# Patient Record
Sex: FEMALE | Race: WHITE | Employment: OTHER | URBAN - METROPOLITAN AREA
[De-identification: names, ages, dates, MRNs, and addresses within clinical notes are randomized per-mention and may not be internally consistent; named-entity substitution may affect disease eponyms.]

---

## 2022-06-01 NOTE — PROGRESS NOTES
Patient ___ reached   __X___not reached-preop instructions left on voice osgp____[phone]_________      DATE__6/3/22______ TIME__1200______ARRIVAL__1030  FEC_______      Nothing to eat or drink after midnight the night before,except for what the prep instructions call for. If you do not have the instructions or do not understand them please contact your doctors office. Follow any instructions your doctors office has given you including what medications to take the AM of your procedure and which ones to hold. You may use your inhalers. If you take a long acting insulin the lynette prior please cut the dose in half and take no diabetic medications that AM.Follow specific doctors office instructions regarding blood thinners and if they want you to hold and for how long. If you are on a blood thinner and have no instructions please contact the office and ask. Dress comfortably,bring your insurance card,picture ID,and a complete list of medications, including supplements. You must have a responsible adult to stay with you during the procedure,drive you home and stay with you. [facility] phone number [phone] for any questions. OTHER INSTRUCTIONS(if applicable)_________________________________________________________        VISITOR POLICY(subject to change)    Current visitor policy is 2 visitors per patient. No children allowed. Mask are required. Visiting hours are 8a-8p. Overnight visitors will be at the discretion of the nurse.

## 2022-06-03 ENCOUNTER — HOSPITAL ENCOUNTER (OUTPATIENT)
Age: 82
Setting detail: OUTPATIENT SURGERY
Discharge: HOME | End: 2022-06-03
Attending: INTERNAL MEDICINE | Admitting: INTERNAL MEDICINE
Payer: MEDICARE

## 2022-06-03 ENCOUNTER — ANESTHESIA (OUTPATIENT)
Dept: ENDOSCOPY | Age: 82
End: 2022-06-03
Payer: MEDICARE

## 2022-06-03 ENCOUNTER — ANESTHESIA EVENT (OUTPATIENT)
Dept: ENDOSCOPY | Age: 82
End: 2022-06-03
Payer: MEDICARE

## 2022-06-03 VITALS
SYSTOLIC BLOOD PRESSURE: 141 MMHG | RESPIRATION RATE: 16 BRPM | HEART RATE: 71 BPM | TEMPERATURE: 98.8 F | OXYGEN SATURATION: 100 % | WEIGHT: 177 LBS | HEIGHT: 64 IN | DIASTOLIC BLOOD PRESSURE: 78 MMHG | BODY MASS INDEX: 30.22 KG/M2

## 2022-06-03 DIAGNOSIS — Z12.11 COLON CANCER SCREENING: ICD-10-CM

## 2022-06-03 DIAGNOSIS — Z86.010 HISTORY OF COLON POLYPS: ICD-10-CM

## 2022-06-03 PROCEDURE — 3609010600 HC COLONOSCOPY POLYPECTOMY SNARE/COLD BIOPSY: Performed by: INTERNAL MEDICINE

## 2022-06-03 PROCEDURE — 2580000003 HC RX 258: Performed by: ANESTHESIOLOGY

## 2022-06-03 PROCEDURE — 2500000003 HC RX 250 WO HCPCS: Performed by: NURSE ANESTHETIST, CERTIFIED REGISTERED

## 2022-06-03 PROCEDURE — 2580000003 HC RX 258: Performed by: NURSE ANESTHETIST, CERTIFIED REGISTERED

## 2022-06-03 PROCEDURE — 3700000001 HC ADD 15 MINUTES (ANESTHESIA): Performed by: INTERNAL MEDICINE

## 2022-06-03 PROCEDURE — 2709999900 HC NON-CHARGEABLE SUPPLY: Performed by: INTERNAL MEDICINE

## 2022-06-03 PROCEDURE — 7100000010 HC PHASE II RECOVERY - FIRST 15 MIN: Performed by: INTERNAL MEDICINE

## 2022-06-03 PROCEDURE — 88305 TISSUE EXAM BY PATHOLOGIST: CPT

## 2022-06-03 PROCEDURE — 7100000011 HC PHASE II RECOVERY - ADDTL 15 MIN: Performed by: INTERNAL MEDICINE

## 2022-06-03 PROCEDURE — 3700000000 HC ANESTHESIA ATTENDED CARE: Performed by: INTERNAL MEDICINE

## 2022-06-03 PROCEDURE — 6370000000 HC RX 637 (ALT 250 FOR IP): Performed by: INTERNAL MEDICINE

## 2022-06-03 PROCEDURE — 6360000002 HC RX W HCPCS: Performed by: NURSE ANESTHETIST, CERTIFIED REGISTERED

## 2022-06-03 RX ORDER — SODIUM CHLORIDE 9 MG/ML
INJECTION, SOLUTION INTRAVENOUS CONTINUOUS
Status: DISCONTINUED | OUTPATIENT
Start: 2022-06-03 | End: 2022-06-03 | Stop reason: HOSPADM

## 2022-06-03 RX ORDER — PROPOFOL 10 MG/ML
INJECTION, EMULSION INTRAVENOUS PRN
Status: DISCONTINUED | OUTPATIENT
Start: 2022-06-03 | End: 2022-06-03 | Stop reason: SDUPTHER

## 2022-06-03 RX ORDER — SODIUM CHLORIDE 9 MG/ML
INJECTION, SOLUTION INTRAVENOUS CONTINUOUS PRN
Status: DISCONTINUED | OUTPATIENT
Start: 2022-06-03 | End: 2022-06-03 | Stop reason: SDUPTHER

## 2022-06-03 RX ORDER — LIDOCAINE HYDROCHLORIDE 20 MG/ML
INJECTION, SOLUTION EPIDURAL; INFILTRATION; INTRACAUDAL; PERINEURAL PRN
Status: DISCONTINUED | OUTPATIENT
Start: 2022-06-03 | End: 2022-06-03 | Stop reason: SDUPTHER

## 2022-06-03 RX ADMIN — SODIUM CHLORIDE: 9 INJECTION, SOLUTION INTRAVENOUS at 11:13

## 2022-06-03 RX ADMIN — PROPOFOL 50 MG: 10 INJECTION, EMULSION INTRAVENOUS at 11:51

## 2022-06-03 RX ADMIN — PROPOFOL 50 MG: 10 INJECTION, EMULSION INTRAVENOUS at 11:45

## 2022-06-03 RX ADMIN — LIDOCAINE HYDROCHLORIDE 100 MG: 20 INJECTION, SOLUTION EPIDURAL; INFILTRATION; INTRACAUDAL; PERINEURAL at 11:36

## 2022-06-03 RX ADMIN — PROPOFOL 50 MG: 10 INJECTION, EMULSION INTRAVENOUS at 11:41

## 2022-06-03 RX ADMIN — SODIUM CHLORIDE: 9 INJECTION, SOLUTION INTRAVENOUS at 11:33

## 2022-06-03 RX ADMIN — PROPOFOL 50 MG: 10 INJECTION, EMULSION INTRAVENOUS at 11:56

## 2022-06-03 RX ADMIN — PROPOFOL 50 MG: 10 INJECTION, EMULSION INTRAVENOUS at 12:01

## 2022-06-03 RX ADMIN — PROPOFOL 50 MG: 10 INJECTION, EMULSION INTRAVENOUS at 11:36

## 2022-06-03 ASSESSMENT — ENCOUNTER SYMPTOMS: SHORTNESS OF BREATH: 0

## 2022-06-03 ASSESSMENT — PAIN - FUNCTIONAL ASSESSMENT: PAIN_FUNCTIONAL_ASSESSMENT: NONE - DENIES PAIN

## 2022-06-03 NOTE — BRIEF OP NOTE
Brief Postoperative Note - Full Note in Chart Review/Procedures tab       Patient: Zach Ventura  YOB: 1940  MRN: [id number]    Date of Procedure: 6/3/2022    Pre-Op Diagnosis: Colon cancer screening [Z12.11]  History of colon polyps [Z86.010]    Post-Op Diagnosis: Same       Procedure(s):  COLONOSCOPY POLYPECTOMY SNARE/COLD BIOPSY    Surgeon(s):  Renae Clark MD    Assistant:  * No surgical staff found *    Anesthesia: Monitor Anesthesia Care    Estimated Blood Loss (mL): Minimal    Complications: None    Specimens:   ID Type Source Tests Collected by Time Destination   A : Descending colon polyp Tissue Colon SURGICAL PATHOLOGY Renae Clark MD 6/3/2022 1202        Implants:  * No implants in log *      Drains: * No LDAs found *    Findings:  1) 4 mm descending colon polyp removed with cold snare. 2) Mild sigmoid diverticulosis    3) Normal terminal ileum    Rec:  High fiber diet  Continue present treatment. Start Benefiber, one tbs twice daily  Await pathology results.    F/U WITH ME AS NEEDED  Due to age, and multiple negative examinations, followup colonoscopy in five years should be considered optional  Followup with referring physician as previously instucted    Electronically signed by Renae Clark MD on 6/3/2022 at 12:08 PM

## 2022-06-03 NOTE — ANESTHESIA POSTPROCEDURE EVALUATION
Department of Anesthesiology  Postprocedure Note    Patient: Francine Owen  MRN: [id number]  YOB: 1940  Date of evaluation: 6/3/2022  Time:  12:14 PM     Procedure Summary     Date: 06/03/22 Room / Location: [address] [place] [address]    Anesthesia Start: 1134 Anesthesia Stop: 1214    Procedure: COLONOSCOPY POLYPECTOMY SNARE/COLD BIOPSY (N/A ) Diagnosis:       Colon cancer screening      History of colon polyps      (Colon cancer screening [Z12.11])      (History of colon polyps [Z86.010])    Surgeons: Mirian Jones MD Responsible Provider: Hannah Davies MD    Anesthesia Type: MAC ASA Status: 2          Anesthesia Type: No value filed. Gardenia Phase I: Gardenia Score: 10    Gardenia Phase II:      Last vitals: Reviewed and per EMR flowsheets. Anesthesia Post Evaluation    Patient location during evaluation: PACU  Level of consciousness: awake  Airway patency: patent  Complications: no  Cardiovascular status: hemodynamically stable  Respiratory status: acceptable  There was medical reason for not using a multimodal analgesia pain management approach.

## 2022-06-03 NOTE — ANESTHESIA PRE PROCEDURE
Department of Anesthesiology  Preprocedure Note       Name:  Petrona Kumar   Age:  [de-identified] y.o.  :  1940                                          MRN:  [id number]         Date:  6/3/2022      Surgeon: Jack Ortiz):  Fariha Flanagan MD    Procedure: Procedure(s):  COLONOSCOPY DIAGNOSTIC    Medications prior to admission:   Prior to Admission medications    Medication Sig Start Date End Date Taking? Authorizing Provider   BACLOFEN PO Take by mouth in the morning, at noon, and at bedtime   Yes Historical Provider, MD   METOPROLOL SUCCINATE PO Take by mouth daily   Yes Historical Provider, MD   carbidopa-levodopa (SINEMET)  MG per tablet Take 1 tablet by mouth 3-4 times daily    Historical Provider, MD   DULoxetine (CYMBALTA) 20 MG capsule Take 20 mg by mouth daily    Historical Provider, MD   omeprazole (PRILOSEC) 20 MG capsule Take 40 mg by mouth daily   Patient not taking: Reported on 6/3/2022    Historical Provider, MD   celecoxib (CELEBREX) 100 MG capsule Take 200 mg by mouth 2 times daily    Historical Provider, MD   melatonin 3 MG TABS tablet Take 3 mg by mouth nightly    Historical Provider, MD   pramipexole (MIRAPEX) 0.25 MG tablet Take 0.25 mg by mouth nightly    Historical Provider, MD       Current medications:    Current Facility-Administered Medications   Medication Dose Route Frequency Provider Last Rate Last Admin    0.9 % sodium chloride infusion   IntraVENous Continuous Kwame Diggs MD           Allergies:     Allergies   Allergen Reactions    Trileptal [Oxcarbazepine]     Yellow Dyes (Non-Tartrazine) Swelling    Minocycline Rash       Problem List:    Patient Active Problem List   Diagnosis Code    Chest pain R07.9    Parkinsonism ([facility] [address] G20       Past Medical History:        Diagnosis Date    Parkinson's disease ([facility] [address]     Spinal stenosis        Past Surgical History:        Procedure Laterality Date    APPENDECTOMY      BACK SURGERY  2014    decompression surgery     SECTION      CHOLECYSTECTOMY         Social History:    Social History     Tobacco Use    Smoking status: Never Smoker    Smokeless tobacco: Not on file   Substance Use Topics    Alcohol use: Yes     Alcohol/week: 2.0 standard drinks     Types: 2 Glasses of wine per week                                Counseling given: Not Answered      Vital Signs (Current):   Vitals:    22 1052   BP: (!) 158/72   Pulse: 76   Resp: 16   Temp: (!) 96.7 °F (35.9 °C)   TempSrc: Temporal   SpO2: 99%   Weight: 177 lb (80.3 kg)   Height: 5' 4\" (1.626 m)                                              BP Readings from Last 3 Encounters:   22 (!) 158/72   10/11/16 122/69   16 120/68       NPO Status:                                                                                 BMI:   Wt Readings from Last 3 Encounters:   22 177 lb (80.3 kg)   10/11/16 166 lb 6.4 oz (75.5 kg)   16 166 lb 12.8 oz (75.7 kg)     Body mass index is 30.38 kg/m². CBC:   Lab Results   Component Value Date    WBC 12.0 2016    RBC 3.94 2016    HGB 12.2 2016    HCT 36.1 2016    MCV 91.7 2016    RDW 14.0 2016     2016       CMP:   Lab Results   Component Value Date     2016    K 4.4 2016     2016    CO2 25 2016    BUN 14 2016    CREATININE 0.7 2016    GFRAA >60 2016    GFRAA >60 2010    AGRATIO 1.0 2016    LABGLOM >60 2016    GLUCOSE 84 2016    PROT 6.9 2016    PROT 7.0 2010    CALCIUM 8.7 2016    BILITOT 0.5 2016    ALKPHOS 73 2016    AST 23 2016    ALT <5 2016       POC Tests: No results for input(s): POCGLU, POCNA, POCK, POCCL, POCBUN, POCHEMO, POCHCT in the last 72 hours.     Coags:   Lab Results   Component Value Date    PROTIME 12.2 2016    INR 1.07 2016    APTT 36.0 2016       HCG (If Applicable): No results found for: PREGTESTUR, PREGSERUM, HCG, HCGQUANT     ABGs: No results found for: PHART, PO2ART, JWD6AMH, HAU0YUW, BEART, O4PIAHOX     Type & Screen (If Applicable):  No results found for: LABABO, LABRH    Drug/Infectious Status (If Applicable):  No results found for: HIV, HEPCAB    COVID-19 Screening (If Applicable): No results found for: COVID19        Anesthesia Evaluation  Patient summary reviewed and Nursing notes reviewed no history of anesthetic complications:   Airway: Mallampati: I  TM distance: >3 FB   Neck ROM: full  Mouth opening: > = 3 FB   Dental: normal exam         Pulmonary:       (-) asthma and shortness of breath                           Cardiovascular:        (-) hypertension and  angina                Neuro/Psych:   (+) neuromuscular disease (states dyskinesia rather than parkinson's):,    (-) CVA           GI/Hepatic/Renal:        (-) GERD and liver disease       Endo/Other:        (-) diabetes mellitus, hypothyroidism               Abdominal:             Vascular:     - PVD. Other Findings:           Anesthesia Plan      MAC     ASA 2       Induction: intravenous. Anesthetic plan and risks discussed with patient. Plan discussed with CRNA.                     Isabel Carrasco MD   6/3/2022

## 2022-06-03 NOTE — H&P
Gastroenterology Note             Pre-operative History and Physical    Patient: Kb Harris  : 1940  CSN:     History Obtained From:  patient and/or guardian. HISTORY OF PRESENT ILLNESS:    The patient is a [de-identified] y.o. female with history of colon polyps and decreased caliber stools here for colonoscopy. Past Medical History:    Past Medical History:   Diagnosis Date    Parkinson's disease ([address]     Spinal stenosis      Past Surgical History:    Past Surgical History:   Procedure Laterality Date    APPENDECTOMY      BACK SURGERY  2014    decompression surgery     SECTION      CHOLECYSTECTOMY       Medications Prior to Admission:   No current facility-administered medications on file prior to encounter. Current Outpatient Medications on File Prior to Encounter   Medication Sig Dispense Refill    carbidopa-levodopa (SINEMET)  MG per tablet Take 1 tablet by mouth 3-4 times daily      DULoxetine (CYMBALTA) 20 MG capsule Take 20 mg by mouth daily      omeprazole (PRILOSEC) 20 MG capsule Take 40 mg by mouth daily       celecoxib (CELEBREX) 100 MG capsule Take 200 mg by mouth 2 times daily      melatonin 3 MG TABS tablet Take 3 mg by mouth nightly      pramipexole (MIRAPEX) 0.25 MG tablet Take 0.25 mg by mouth nightly          Allergies:  Trileptal [oxcarbazepine], Yellow dyes (non-tartrazine), and Minocycline      Social History:   Social History     Tobacco Use    Smoking status: Never Smoker    Smokeless tobacco: Not on file   Substance Use Topics    Alcohol use: Yes     Alcohol/week: 2.0 standard drinks     Types: 2 Glasses of wine per week     Family History:   No family history on file. PHYSICAL EXAM:      There were no vitals taken for this visit.  I        Heart:   RRR, normal s1s2    Lungs:  CTA bilat,  Normal effort    Abdomen:   NT, ND      ASA Grade:  ASA 2 - Patient with mild systemic disease with no functional limitations    Mallampati Class:  Class I: Soft palate, uvula, fauces, pillars visible  ____X_____  Class II: Soft palate, uvula, fauces visible  __________   Class III: Soft palate, base of uvula visible  __________  Class IV: Hard palate only visible   __________        ASSESSMENT AND PLAN:    1. Patient is a [de-identified] y.o. female here for colonoscopy with MAC.   2.  Procedure options, risks and benefits reviewed with patient. Patient expresses understanding.      Curtis Guzman MD  [facility]  6/3/2022

## 2023-03-31 ENCOUNTER — APPOINTMENT (OUTPATIENT)
Dept: GENERAL RADIOLOGY | Age: 83
End: 2023-03-31
Payer: MEDICARE

## 2023-03-31 ENCOUNTER — APPOINTMENT (OUTPATIENT)
Dept: CT IMAGING | Age: 83
End: 2023-03-31
Payer: MEDICARE

## 2023-03-31 ENCOUNTER — HOSPITAL ENCOUNTER (OUTPATIENT)
Age: 83
Setting detail: OBSERVATION
Discharge: HOME | End: 2023-04-03
Attending: STUDENT IN AN ORGANIZED HEALTH CARE EDUCATION/TRAINING PROGRAM | Admitting: INTERNAL MEDICINE
Payer: MEDICARE

## 2023-03-31 DIAGNOSIS — Z71.89 GOALS OF CARE, COUNSELING/DISCUSSION: ICD-10-CM

## 2023-03-31 DIAGNOSIS — E16.2 HYPOGLYCEMIA: ICD-10-CM

## 2023-03-31 DIAGNOSIS — R55 SYNCOPE AND COLLAPSE: Primary | ICD-10-CM

## 2023-03-31 DIAGNOSIS — S82.53XA DISPLACED FRACTURE OF MEDIAL MALLEOLUS OF UNSPECIFIED TIBIA, INITIAL ENCOUNTER FOR CLOSED FRACTURE: ICD-10-CM

## 2023-03-31 DIAGNOSIS — Z78.9 PATIENT IS FULL CODE: ICD-10-CM

## 2023-03-31 DIAGNOSIS — E86.0 DEHYDRATION: ICD-10-CM

## 2023-03-31 PROBLEM — S82.51XA DISPLACED FRACTURE OF MEDIAL MALLEOLUS OF RIGHT TIBIA, INITIAL ENCOUNTER FOR CLOSED FRACTURE: Status: ACTIVE | Noted: 2023-03-31

## 2023-03-31 LAB
ALBUMIN SERPL-MCNC: 3.9 G/DL (ref 3.4–5)
ALBUMIN/GLOB SERPL: 1.6 {RATIO} (ref 1.1–2.2)
ALP SERPL-CCNC: 66 U/L (ref 40–129)
ALT SERPL-CCNC: 13 U/L (ref 10–40)
ANION GAP SERPL CALCULATED.3IONS-SCNC: 6 MMOL/L (ref 3–16)
AST SERPL-CCNC: 16 U/L (ref 15–37)
BASOPHILS # BLD: 0.1 K/UL (ref 0–0.2)
BASOPHILS NFR BLD: 1 %
BILIRUB SERPL-MCNC: 0.5 MG/DL (ref 0–1)
BUN SERPL-MCNC: 22 MG/DL (ref 7–20)
CALCIUM SERPL-MCNC: 9.1 MG/DL (ref 8.3–10.6)
CHLORIDE SERPL-SCNC: 104 MMOL/L (ref 99–110)
CO2 SERPL-SCNC: 29 MMOL/L (ref 21–32)
CREAT SERPL-MCNC: 0.9 MG/DL (ref 0.6–1.2)
DEPRECATED RDW RBC AUTO: 14.8 % (ref 12.4–15.4)
EKG ATRIAL RATE: 58 BPM
EKG DIAGNOSIS: NORMAL
EKG P AXIS: 67 DEGREES
EKG P-R INTERVAL: 178 MS
EKG Q-T INTERVAL: 390 MS
EKG QRS DURATION: 76 MS
EKG QTC CALCULATION (BAZETT): 382 MS
EKG R AXIS: 52 DEGREES
EKG T AXIS: 66 DEGREES
EKG VENTRICULAR RATE: 58 BPM
EOSINOPHIL # BLD: 0.2 K/UL (ref 0–0.6)
EOSINOPHIL NFR BLD: 2.6 %
GFR SERPLBLD CREATININE-BSD FMLA CKD-EPI: >60 ML/MIN/{1.73_M2}
GLUCOSE BLD-MCNC: 128 MG/DL (ref 70–99)
GLUCOSE BLD-MCNC: 57 MG/DL (ref 70–99)
GLUCOSE SERPL-MCNC: 103 MG/DL (ref 70–99)
HCT VFR BLD AUTO: 40 % (ref 36–48)
HGB BLD-MCNC: 13.3 G/DL (ref 12–16)
LYMPHOCYTES # BLD: 2 K/UL (ref 1–5.1)
LYMPHOCYTES NFR BLD: 29.9 %
MCH RBC QN AUTO: 31.1 PG (ref 26–34)
MCHC RBC AUTO-ENTMCNC: 33.3 G/DL (ref 31–36)
MCV RBC AUTO: 93.2 FL (ref 80–100)
MONOCYTES # BLD: 0.5 K/UL (ref 0–1.3)
MONOCYTES NFR BLD: 7.9 %
NEUTROPHILS # BLD: 3.8 K/UL (ref 1.7–7.7)
NEUTROPHILS NFR BLD: 58.6 %
PERFORMED ON: ABNORMAL
PERFORMED ON: ABNORMAL
PLATELET # BLD AUTO: 195 K/UL (ref 135–450)
PMV BLD AUTO: 7.4 FL (ref 5–10.5)
POTASSIUM SERPL-SCNC: 4.5 MMOL/L (ref 3.5–5.1)
PROT SERPL-MCNC: 6.3 G/DL (ref 6.4–8.2)
RBC # BLD AUTO: 4.29 M/UL (ref 4–5.2)
SODIUM SERPL-SCNC: 139 MMOL/L (ref 136–145)
TROPONIN T SERPL-MCNC: <0.01 NG/ML
WBC # BLD AUTO: 6.5 K/UL (ref 4–11)

## 2023-03-31 PROCEDURE — 96374 THER/PROPH/DIAG INJ IV PUSH: CPT

## 2023-03-31 PROCEDURE — 6370000000 HC RX 637 (ALT 250 FOR IP): Performed by: NURSE PRACTITIONER

## 2023-03-31 PROCEDURE — 93005 ELECTROCARDIOGRAM TRACING: CPT | Performed by: STUDENT IN AN ORGANIZED HEALTH CARE EDUCATION/TRAINING PROGRAM

## 2023-03-31 PROCEDURE — 73130 X-RAY EXAM OF HAND: CPT

## 2023-03-31 PROCEDURE — 93010 ELECTROCARDIOGRAM REPORT: CPT | Performed by: INTERNAL MEDICINE

## 2023-03-31 PROCEDURE — 96361 HYDRATE IV INFUSION ADD-ON: CPT

## 2023-03-31 PROCEDURE — 6360000002 HC RX W HCPCS

## 2023-03-31 PROCEDURE — 2580000003 HC RX 258: Performed by: NURSE PRACTITIONER

## 2023-03-31 PROCEDURE — 70450 CT HEAD/BRAIN W/O DYE: CPT

## 2023-03-31 PROCEDURE — 6360000002 HC RX W HCPCS: Performed by: INTERNAL MEDICINE

## 2023-03-31 PROCEDURE — 96376 TX/PRO/DX INJ SAME DRUG ADON: CPT

## 2023-03-31 PROCEDURE — 73502 X-RAY EXAM HIP UNI 2-3 VIEWS: CPT

## 2023-03-31 PROCEDURE — 6360000002 HC RX W HCPCS: Performed by: NURSE PRACTITIONER

## 2023-03-31 PROCEDURE — 72125 CT NECK SPINE W/O DYE: CPT

## 2023-03-31 PROCEDURE — 84484 ASSAY OF TROPONIN QUANT: CPT

## 2023-03-31 PROCEDURE — G0378 HOSPITAL OBSERVATION PER HR: HCPCS

## 2023-03-31 PROCEDURE — 2580000003 HC RX 258

## 2023-03-31 PROCEDURE — 99285 EMERGENCY DEPT VISIT HI MDM: CPT

## 2023-03-31 PROCEDURE — 6370000000 HC RX 637 (ALT 250 FOR IP): Performed by: INTERNAL MEDICINE

## 2023-03-31 PROCEDURE — 71045 X-RAY EXAM CHEST 1 VIEW: CPT

## 2023-03-31 PROCEDURE — 1200000000 HC SEMI PRIVATE

## 2023-03-31 PROCEDURE — 73110 X-RAY EXAM OF WRIST: CPT

## 2023-03-31 PROCEDURE — 80053 COMPREHEN METABOLIC PANEL: CPT

## 2023-03-31 PROCEDURE — 73610 X-RAY EXAM OF ANKLE: CPT

## 2023-03-31 PROCEDURE — 2500000003 HC RX 250 WO HCPCS: Performed by: NURSE PRACTITIONER

## 2023-03-31 PROCEDURE — 85025 COMPLETE CBC W/AUTO DIFF WBC: CPT

## 2023-03-31 PROCEDURE — 96375 TX/PRO/DX INJ NEW DRUG ADDON: CPT

## 2023-03-31 PROCEDURE — 6370000000 HC RX 637 (ALT 250 FOR IP)

## 2023-03-31 RX ORDER — LANOLIN ALCOHOL/MO/W.PET/CERES
3 CREAM (GRAM) TOPICAL NIGHTLY
Status: DISCONTINUED | OUTPATIENT
Start: 2023-03-31 | End: 2023-04-03 | Stop reason: HOSPADM

## 2023-03-31 RX ORDER — DIPHENHYDRAMINE HCL 25 MG
25 TABLET ORAL NIGHTLY PRN
Status: DISCONTINUED | OUTPATIENT
Start: 2023-03-31 | End: 2023-04-03 | Stop reason: HOSPADM

## 2023-03-31 RX ORDER — 0.9 % SODIUM CHLORIDE 0.9 %
1000 INTRAVENOUS SOLUTION INTRAVENOUS ONCE
Status: COMPLETED | OUTPATIENT
Start: 2023-03-31 | End: 2023-03-31

## 2023-03-31 RX ORDER — LANOLIN ALCOHOL/MO/W.PET/CERES
3 CREAM (GRAM) TOPICAL NIGHTLY
Status: CANCELLED | OUTPATIENT
Start: 2023-03-31

## 2023-03-31 RX ORDER — FENTANYL CITRATE 50 UG/ML
50 INJECTION, SOLUTION INTRAMUSCULAR; INTRAVENOUS ONCE
Status: DISCONTINUED | OUTPATIENT
Start: 2023-03-31 | End: 2023-03-31

## 2023-03-31 RX ORDER — ENOXAPARIN SODIUM 100 MG/ML
40 INJECTION SUBCUTANEOUS DAILY
Status: CANCELLED | OUTPATIENT
Start: 2023-03-31

## 2023-03-31 RX ORDER — POLYETHYLENE GLYCOL 3350 17 G/17G
17 POWDER, FOR SOLUTION ORAL DAILY PRN
Status: CANCELLED | OUTPATIENT
Start: 2023-03-31

## 2023-03-31 RX ORDER — SODIUM CHLORIDE 0.9 % (FLUSH) 0.9 %
5-40 SYRINGE (ML) INJECTION EVERY 12 HOURS SCHEDULED
Status: CANCELLED | OUTPATIENT
Start: 2023-03-31

## 2023-03-31 RX ORDER — ACETAMINOPHEN 325 MG/1
650 TABLET ORAL EVERY 6 HOURS PRN
Status: CANCELLED | OUTPATIENT
Start: 2023-03-31

## 2023-03-31 RX ORDER — POLYETHYLENE GLYCOL 3350 17 G/17G
17 POWDER, FOR SOLUTION ORAL DAILY PRN
Status: DISCONTINUED | OUTPATIENT
Start: 2023-03-31 | End: 2023-04-02

## 2023-03-31 RX ORDER — MORPHINE SULFATE 2 MG/ML
2 INJECTION, SOLUTION INTRAMUSCULAR; INTRAVENOUS
Status: DISCONTINUED | OUTPATIENT
Start: 2023-03-31 | End: 2023-04-01

## 2023-03-31 RX ORDER — PRAMIPEXOLE DIHYDROCHLORIDE 0.25 MG/1
0.25 TABLET ORAL NIGHTLY
Status: DISCONTINUED | OUTPATIENT
Start: 2023-03-31 | End: 2023-04-03 | Stop reason: HOSPADM

## 2023-03-31 RX ORDER — SODIUM CHLORIDE 0.9 % (FLUSH) 0.9 %
5-40 SYRINGE (ML) INJECTION EVERY 12 HOURS SCHEDULED
Status: DISCONTINUED | OUTPATIENT
Start: 2023-03-31 | End: 2023-04-03 | Stop reason: HOSPADM

## 2023-03-31 RX ORDER — FENTANYL CITRATE 50 UG/ML
50 INJECTION, SOLUTION INTRAMUSCULAR; INTRAVENOUS ONCE
Status: COMPLETED | OUTPATIENT
Start: 2023-03-31 | End: 2023-03-31

## 2023-03-31 RX ORDER — OXYCODONE AND ACETAMINOPHEN 10; 325 MG/1; MG/1
1 TABLET ORAL EVERY 4 HOURS PRN
Status: DISCONTINUED | OUTPATIENT
Start: 2023-03-31 | End: 2023-04-03 | Stop reason: HOSPADM

## 2023-03-31 RX ORDER — ACETAMINOPHEN 325 MG/1
650 TABLET ORAL EVERY 6 HOURS PRN
Status: DISCONTINUED | OUTPATIENT
Start: 2023-03-31 | End: 2023-04-03 | Stop reason: HOSPADM

## 2023-03-31 RX ORDER — ONDANSETRON 4 MG/1
4 TABLET, ORALLY DISINTEGRATING ORAL EVERY 8 HOURS PRN
Status: CANCELLED | OUTPATIENT
Start: 2023-03-31

## 2023-03-31 RX ORDER — ONDANSETRON 2 MG/ML
4 INJECTION INTRAMUSCULAR; INTRAVENOUS EVERY 6 HOURS PRN
Status: CANCELLED | OUTPATIENT
Start: 2023-03-31

## 2023-03-31 RX ORDER — MORPHINE SULFATE 4 MG/ML
4 INJECTION, SOLUTION INTRAMUSCULAR; INTRAVENOUS
Status: DISCONTINUED | OUTPATIENT
Start: 2023-03-31 | End: 2023-04-01

## 2023-03-31 RX ORDER — SODIUM CHLORIDE 9 MG/ML
INJECTION, SOLUTION INTRAVENOUS PRN
Status: DISCONTINUED | OUTPATIENT
Start: 2023-03-31 | End: 2023-04-03 | Stop reason: HOSPADM

## 2023-03-31 RX ORDER — SODIUM CHLORIDE 9 MG/ML
INJECTION, SOLUTION INTRAVENOUS PRN
Status: CANCELLED | OUTPATIENT
Start: 2023-03-31

## 2023-03-31 RX ORDER — ONDANSETRON 2 MG/ML
4 INJECTION INTRAMUSCULAR; INTRAVENOUS EVERY 6 HOURS PRN
Status: DISCONTINUED | OUTPATIENT
Start: 2023-03-31 | End: 2023-04-03 | Stop reason: HOSPADM

## 2023-03-31 RX ORDER — SODIUM CHLORIDE 0.9 % (FLUSH) 0.9 %
5-40 SYRINGE (ML) INJECTION PRN
Status: DISCONTINUED | OUTPATIENT
Start: 2023-03-31 | End: 2023-04-03 | Stop reason: HOSPADM

## 2023-03-31 RX ORDER — DIPHENHYDRAMINE HCL 25 MG
25 TABLET ORAL NIGHTLY PRN
COMMUNITY

## 2023-03-31 RX ORDER — DEXTROSE MONOHYDRATE 25 G/50ML
25 INJECTION, SOLUTION INTRAVENOUS ONCE
Status: COMPLETED | OUTPATIENT
Start: 2023-03-31 | End: 2023-03-31

## 2023-03-31 RX ORDER — DIPHENHYDRAMINE HCL 25 MG
25 TABLET ORAL NIGHTLY PRN
Status: DISCONTINUED | OUTPATIENT
Start: 2023-04-01 | End: 2023-03-31

## 2023-03-31 RX ORDER — SODIUM CHLORIDE 9 MG/ML
INJECTION, SOLUTION INTRAVENOUS CONTINUOUS
Status: DISCONTINUED | OUTPATIENT
Start: 2023-03-31 | End: 2023-04-01

## 2023-03-31 RX ORDER — ACETAMINOPHEN 650 MG/1
650 SUPPOSITORY RECTAL EVERY 6 HOURS PRN
Status: DISCONTINUED | OUTPATIENT
Start: 2023-03-31 | End: 2023-04-03 | Stop reason: HOSPADM

## 2023-03-31 RX ORDER — ACETAMINOPHEN 650 MG/1
650 SUPPOSITORY RECTAL EVERY 6 HOURS PRN
Status: CANCELLED | OUTPATIENT
Start: 2023-03-31

## 2023-03-31 RX ORDER — SODIUM CHLORIDE 0.9 % (FLUSH) 0.9 %
5-40 SYRINGE (ML) INJECTION PRN
Status: CANCELLED | OUTPATIENT
Start: 2023-03-31

## 2023-03-31 RX ORDER — ONDANSETRON 4 MG/1
4 TABLET, ORALLY DISINTEGRATING ORAL EVERY 8 HOURS PRN
Status: DISCONTINUED | OUTPATIENT
Start: 2023-03-31 | End: 2023-04-03 | Stop reason: HOSPADM

## 2023-03-31 RX ORDER — ENOXAPARIN SODIUM 100 MG/ML
40 INJECTION SUBCUTANEOUS DAILY
Status: DISCONTINUED | OUTPATIENT
Start: 2023-04-01 | End: 2023-04-03 | Stop reason: HOSPADM

## 2023-03-31 RX ADMIN — MORPHINE SULFATE 2 MG: 2 INJECTION, SOLUTION INTRAMUSCULAR; INTRAVENOUS at 21:24

## 2023-03-31 RX ADMIN — DIPHENHYDRAMINE HYDROCHLORIDE 25 MG: 25 TABLET ORAL at 23:54

## 2023-03-31 RX ADMIN — SODIUM CHLORIDE: 9 INJECTION, SOLUTION INTRAVENOUS at 23:26

## 2023-03-31 RX ADMIN — DEXTROSE MONOHYDRATE 25 G: 25 INJECTION, SOLUTION INTRAVENOUS at 10:08

## 2023-03-31 RX ADMIN — FENTANYL CITRATE 50 MCG: 50 INJECTION, SOLUTION INTRAMUSCULAR; INTRAVENOUS at 16:34

## 2023-03-31 RX ADMIN — OXYCODONE AND ACETAMINOPHEN 1 TABLET: 325; 10 TABLET ORAL at 20:11

## 2023-03-31 RX ADMIN — MORPHINE SULFATE 4 MG: 4 INJECTION, SOLUTION INTRAMUSCULAR; INTRAVENOUS at 15:20

## 2023-03-31 RX ADMIN — Medication 10 ML: at 21:42

## 2023-03-31 RX ADMIN — DULOXETINE HYDROCHLORIDE 90 MG: 60 CAPSULE, DELAYED RELEASE ORAL at 23:25

## 2023-03-31 RX ADMIN — Medication 3 MG: at 21:35

## 2023-03-31 RX ADMIN — FENTANYL CITRATE 50 MCG: 50 INJECTION, SOLUTION INTRAMUSCULAR; INTRAVENOUS at 11:03

## 2023-03-31 RX ADMIN — SODIUM CHLORIDE 1000 ML: 9 INJECTION, SOLUTION INTRAVENOUS at 10:08

## 2023-03-31 RX ADMIN — CARBIDOPA AND LEVODOPA 1 TABLET: 10; 100 TABLET ORAL at 21:35

## 2023-03-31 ASSESSMENT — PAIN DESCRIPTION - LOCATION
LOCATION: ANKLE

## 2023-03-31 ASSESSMENT — PAIN DESCRIPTION - ORIENTATION
ORIENTATION: RIGHT

## 2023-03-31 ASSESSMENT — PAIN DESCRIPTION - DESCRIPTORS
DESCRIPTORS: ACHING
DESCRIPTORS: ACHING

## 2023-03-31 ASSESSMENT — PAIN SCALES - GENERAL
PAINLEVEL_OUTOF10: 6
PAINLEVEL_OUTOF10: 9
PAINLEVEL_OUTOF10: 7
PAINLEVEL_OUTOF10: 2
PAINLEVEL_OUTOF10: 4
PAINLEVEL_OUTOF10: 6
PAINLEVEL_OUTOF10: 7
PAINLEVEL_OUTOF10: 7

## 2023-03-31 ASSESSMENT — PAIN - FUNCTIONAL ASSESSMENT: PAIN_FUNCTIONAL_ASSESSMENT: NONE - DENIES PAIN

## 2023-03-31 NOTE — H&P
sinus bradycardia, low suspicion for cardiac cause of syncope  - Will start IVF @ 75mL/hour given concurrent soft BP  - Hold home metoprolol succinate 25mg in setting of soft BP  - Orthostatic vitals to be collected    Hypoglycemia, POA  - BG 57 on admission, received D50 in ED  - Continue IVF @ 75 mL/hr overnight  - Encourage adequate po intake  - Continue BG checks PRN    Parkinson's Disease  - Continue home carbidopa-levodopa TID  - PT/OT to evaluate    Restless leg syndrome  - Continue home pramipexole 0.25mg bedtime  - Continue pain management to avoid pain as an exacerbation of RLS    Hx of SVT  - Holding home metoprolol succinate 25mg in setting of soft BP and sinus bradycardia on admission, will revaluate as needed    Depression  - Continue home duloxetine 90mg    DVT Prophylaxis: Lovenox  Diet: ADULT DIET; Regular  Code Status: Prior    PT/OT Eval Status: ordered    Dispo - pending pain control, ortho eval       Quinton Fernando MD    Thank you Su King MD for the opportunity to be involved in this patient's care. If you have any questions or concerns please feel free to contact me at [phone]. Addendum to Resident H& P note:  Pt seen,examined and evaluated with resident and discussed regarding POC . I have reviewed the current history, physical findings, labs and assessment and plan , edited the note where appropriate and agree with note as documented by resident DO ( Candi Dial)    82 yo CF p/w fall and syncope . She had Right ankle fracture and Right ankle pain . Ortho consulted and had splint in ED . Ortho recommended conservative Mx . Will admit and get PT/OT with WBAT on Right Leg . The patient and / or the family were informed of the results of tests, a time was given to answer questions, a plan was proposed and they agreed with plan. Medical reconciliation performed.      Uri Rojas MD  Hospitalist Physician

## 2023-03-31 NOTE — ED NOTES
Hospitalist paged -patient reporting morphine \"does nothing\" for pain.        Aleks Yanes RN  03/31/23 0579

## 2023-03-31 NOTE — CARE COORDINATION
Attempted to see patient. Patient crying in pain and unable to interview. MD and RN updated.   Electronically signed by YARI Santana on 3/31/2023 at 3:46 PM

## 2023-03-31 NOTE — ED PROVIDER NOTES
The Ekg interpreted by me shows  Sinus bradycardia with a rate of 58  Axis is normal  QTc is normal  Intervals and Durations are unremarkable. ST Segments: Nonspecific ST changes    I did not see or evaluate this patient. This is ekg interpretation only.          Mookie Carlisle MD  03/31/23 3704
Mild osteoarthritis at the 1st   carpometacarpal joint. RIGHT HAND:      No acute abnormality of the right hand. 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 PELVIS/RIGHT HIP:      No acute radiographic abnormality of the right hip or osseous pelvis. Mild   symmetric bilateral hip and SI joint osteoarthritis. RIGHT ANKLE:      Acute mildly displaced transverse fracture of the medial malleolus, with   overlying soft tissue swelling. XR CHEST PORTABLE   Final Result   CHEST:      Mild bibasilar atelectasis, without additional acute intrathoracic   abnormality. RIGHT WRIST:      No acute abnormality of the right wrist.  Mild osteoarthritis at the 1st   carpometacarpal joint. RIGHT HAND:      No acute abnormality of the right hand. 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 PELVIS/RIGHT HIP:      No acute radiographic abnormality of the right hip or osseous pelvis. Mild   symmetric bilateral hip and SI joint osteoarthritis. RIGHT ANKLE:      Acute mildly displaced transverse fracture of the medial malleolus, with   overlying soft tissue swelling. XR ANKLE RIGHT (MIN 3 VIEWS)   Final Result   CHEST:      Mild bibasilar atelectasis, without additional acute intrathoracic   abnormality. RIGHT WRIST:      No acute abnormality of the right wrist.  Mild osteoarthritis at the 1st   carpometacarpal joint. RIGHT HAND:      No acute abnormality of the right hand. 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 PELVIS/RIGHT HIP:      No acute radiographic abnormality of the right hip or osseous pelvis. Mild   symmetric bilateral hip and SI joint osteoarthritis. RIGHT ANKLE:      Acute mildly displaced transverse fracture of the medial malleolus, with   overlying soft tissue swelling. CT HEAD WO CONTRAST   Final Result   Atrophy and mild small vessel ischemic disease.          CT CERVICAL SPINE WO CONTRAST   Final Result   No acute osseous

## 2023-03-31 NOTE — ED NOTES
Total fentanyl wasted 50mcg in pyxis and another 50mcg total 100mcg.       Juliana Ramírez RN  03/31/23 8138

## 2023-04-01 LAB
ANION GAP SERPL CALCULATED.3IONS-SCNC: 6 MMOL/L (ref 3–16)
BUN SERPL-MCNC: 18 MG/DL (ref 7–20)
CALCIUM SERPL-MCNC: 8.9 MG/DL (ref 8.3–10.6)
CHLORIDE SERPL-SCNC: 107 MMOL/L (ref 99–110)
CO2 SERPL-SCNC: 28 MMOL/L (ref 21–32)
CREAT SERPL-MCNC: 0.8 MG/DL (ref 0.6–1.2)
GFR SERPLBLD CREATININE-BSD FMLA CKD-EPI: >60 ML/MIN/{1.73_M2}
GLUCOSE SERPL-MCNC: 102 MG/DL (ref 70–99)
POTASSIUM SERPL-SCNC: 4.6 MMOL/L (ref 3.5–5.1)
SODIUM SERPL-SCNC: 141 MMOL/L (ref 136–145)

## 2023-04-01 PROCEDURE — 6370000000 HC RX 637 (ALT 250 FOR IP): Performed by: NURSE PRACTITIONER

## 2023-04-01 PROCEDURE — 6370000000 HC RX 637 (ALT 250 FOR IP)

## 2023-04-01 PROCEDURE — 80048 BASIC METABOLIC PNL TOTAL CA: CPT

## 2023-04-01 PROCEDURE — 2580000003 HC RX 258

## 2023-04-01 PROCEDURE — 99221 1ST HOSP IP/OBS SF/LOW 40: CPT | Performed by: SPECIALIST/TECHNOLOGIST

## 2023-04-01 PROCEDURE — 36415 COLL VENOUS BLD VENIPUNCTURE: CPT

## 2023-04-01 PROCEDURE — 6370000000 HC RX 637 (ALT 250 FOR IP): Performed by: INTERNAL MEDICINE

## 2023-04-01 PROCEDURE — 96372 THER/PROPH/DIAG INJ SC/IM: CPT

## 2023-04-01 PROCEDURE — G0378 HOSPITAL OBSERVATION PER HR: HCPCS

## 2023-04-01 PROCEDURE — 6360000002 HC RX W HCPCS

## 2023-04-01 RX ORDER — CLONAZEPAM 1 MG/1
1 TABLET ORAL NIGHTLY PRN
Status: ON HOLD | COMMUNITY
End: 2023-04-03 | Stop reason: HOSPADM

## 2023-04-01 RX ORDER — LETROZOLE 2.5 MG/1
2.5 TABLET, FILM COATED ORAL DAILY
Status: DISCONTINUED | OUTPATIENT
Start: 2023-04-01 | End: 2023-04-03 | Stop reason: HOSPADM

## 2023-04-01 RX ORDER — LETROZOLE 2.5 MG/1
1 TABLET, FILM COATED ORAL DAILY
COMMUNITY
Start: 2022-08-18

## 2023-04-01 RX ORDER — OXYCODONE AND ACETAMINOPHEN 7.5; 325 MG/1; MG/1
1 TABLET ORAL EVERY 4 HOURS PRN
Status: DISCONTINUED | OUTPATIENT
Start: 2023-04-01 | End: 2023-04-03 | Stop reason: HOSPADM

## 2023-04-01 RX ORDER — CLONAZEPAM 1 MG/1
1 TABLET ORAL NIGHTLY PRN
Status: DISCONTINUED | OUTPATIENT
Start: 2023-04-01 | End: 2023-04-03 | Stop reason: HOSPADM

## 2023-04-01 RX ORDER — DEXTROSE MONOHYDRATE 100 MG/ML
INJECTION, SOLUTION INTRAVENOUS CONTINUOUS PRN
Status: DISCONTINUED | OUTPATIENT
Start: 2023-04-01 | End: 2023-04-03 | Stop reason: HOSPADM

## 2023-04-01 RX ORDER — CLONAZEPAM 1 MG/1
1 TABLET ORAL NIGHTLY PRN
Status: DISCONTINUED | OUTPATIENT
Start: 2023-04-02 | End: 2023-04-01

## 2023-04-01 RX ADMIN — CARBIDOPA AND LEVODOPA 1 TABLET: 10; 100 TABLET ORAL at 10:17

## 2023-04-01 RX ADMIN — ENOXAPARIN SODIUM 40 MG: 100 INJECTION SUBCUTANEOUS at 10:18

## 2023-04-01 RX ADMIN — DULOXETINE HYDROCHLORIDE 90 MG: 60 CAPSULE, DELAYED RELEASE ORAL at 10:17

## 2023-04-01 RX ADMIN — Medication 3 MG: at 21:00

## 2023-04-01 RX ADMIN — LETROZOLE 2.5 MG: 2.5 TABLET, FILM COATED ORAL at 16:40

## 2023-04-01 RX ADMIN — OXYCODONE AND ACETAMINOPHEN 1 TABLET: 325; 10 TABLET ORAL at 05:33

## 2023-04-01 RX ADMIN — CARBIDOPA AND LEVODOPA 2 TABLET: 10; 100 TABLET ORAL at 16:39

## 2023-04-01 RX ADMIN — PRAMIPEXOLE DIHYDROCHLORIDE 0.25 MG: 0.25 TABLET ORAL at 21:00

## 2023-04-01 RX ADMIN — Medication 10 ML: at 10:18

## 2023-04-01 RX ADMIN — OXYCODONE AND ACETAMINOPHEN 1 TABLET: 7.5; 325 TABLET ORAL at 12:58

## 2023-04-01 RX ADMIN — OXYCODONE AND ACETAMINOPHEN 1 TABLET: 7.5; 325 TABLET ORAL at 17:32

## 2023-04-01 RX ADMIN — CLONAZEPAM 1 MG: 1 TABLET ORAL at 23:59

## 2023-04-01 ASSESSMENT — PAIN SCALES - GENERAL
PAINLEVEL_OUTOF10: 6
PAINLEVEL_OUTOF10: 7
PAINLEVEL_OUTOF10: 6
PAINLEVEL_OUTOF10: 4

## 2023-04-01 ASSESSMENT — PAIN DESCRIPTION - DESCRIPTORS: DESCRIPTORS: ACHING;SORE

## 2023-04-01 ASSESSMENT — PAIN DESCRIPTION - ORIENTATION: ORIENTATION: RIGHT

## 2023-04-01 ASSESSMENT — PAIN DESCRIPTION - PAIN TYPE: TYPE: ACUTE PAIN

## 2023-04-01 ASSESSMENT — PAIN - FUNCTIONAL ASSESSMENT: PAIN_FUNCTIONAL_ASSESSMENT: PREVENTS OR INTERFERES SOME ACTIVE ACTIVITIES AND ADLS

## 2023-04-01 ASSESSMENT — PAIN DESCRIPTION - LOCATION: LOCATION: ANKLE

## 2023-04-01 NOTE — CONSULTS
Consult placed    Who: Dr. Richie Marie Surgery  Date:4/1/2023,  Time:7:29 AM        Electronically signed by Abhi Dutta on 4/1/2023 at 7:29 AM
acute intrathoracic   abnormality. RIGHT WRIST:      No acute abnormality of the right wrist.  Mild osteoarthritis at the 1st   carpometacarpal joint. RIGHT HAND:      No acute abnormality of the right hand. 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 PELVIS/RIGHT HIP:      No acute radiographic abnormality of the right hip or osseous pelvis. Mild   symmetric bilateral hip and SI joint osteoarthritis. RIGHT ANKLE:      Acute mildly displaced transverse fracture of the medial malleolus, with   overlying soft tissue swelling. XR CHEST PORTABLE   Final Result   CHEST:      Mild bibasilar atelectasis, without additional acute intrathoracic   abnormality. RIGHT WRIST:      No acute abnormality of the right wrist.  Mild osteoarthritis at the 1st   carpometacarpal joint. RIGHT HAND:      No acute abnormality of the right hand. 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 PELVIS/RIGHT HIP:      No acute radiographic abnormality of the right hip or osseous pelvis. Mild   symmetric bilateral hip and SI joint osteoarthritis. RIGHT ANKLE:      Acute mildly displaced transverse fracture of the medial malleolus, with   overlying soft tissue swelling. XR ANKLE RIGHT (MIN 3 VIEWS)   Final Result   CHEST:      Mild bibasilar atelectasis, without additional acute intrathoracic   abnormality. RIGHT WRIST:      No acute abnormality of the right wrist.  Mild osteoarthritis at the 1st   carpometacarpal joint. RIGHT HAND:      No acute abnormality of the right hand. 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 PELVIS/RIGHT HIP:      No acute radiographic abnormality of the right hip or osseous pelvis. Mild   symmetric bilateral hip and SI joint osteoarthritis. RIGHT ANKLE:      Acute mildly displaced transverse fracture of the medial malleolus, with   overlying soft tissue swelling.          CT HEAD WO CONTRAST   Final Result   Atrophy and mild small

## 2023-04-01 NOTE — DISCHARGE INSTR - COC
Continuity of Care Form    Patient Name: Cheri Fair   :    MRN:  [id number]    [facility] date:  3/31/2023  Discharge date:  4/3/23    Code Status Order: Full Code   Advance Directives:     Admitting Physician:  Aaliyah Kolb MD  PCP: Gustabo Stevens MD    Discharging Nurse: [facility]  Unit/Room#: 3483/7640-97  Discharging Unit Phone Number: [phone]    Emergency Contact:   Extended Emergency Contact Information  Primary Emergency Contact: [facility]  Address: Tristen Trinidad [address] [place] [address] [address]  Home Phone: [phone]  Work Phone: [phone]  Mobile Phone: [phone]  Relation: Spouse    Past Surgical History:  Past Surgical History:   Procedure Laterality Date    APPENDECTOMY      BACK SURGERY  2014    decompression surgery     SECTION      CHOLECYSTECTOMY      COLONOSCOPY      COLONOSCOPY N/A 6/3/2022    COLONOSCOPY POLYPECTOMY SNARE/COLD BIOPSY [facility] [address] [facility]       Immunization History:   Immunization History   Administered Date(s) Administered    COVID-19, [address] border, Primary or Immunocompromised, (age 12y+), IM, 100 mcg/0.5mL 2021, 2021, 10/30/2021    COVID-19, MODERNA Bivalent BOOSTER, (age 12y+), IM, 50 mcg/0.5 mL 10/07/2022    COVID-19, MODERNA Booster BLUE border, (age 18y+), IM, 50mcg/0.25mL 2022       Active Problems:  Patient Active Problem List   Diagnosis Code    Chest pain R07.9    Parkinsonism ([facility] [address] G20    Syncope R55    Displaced fracture of medial malleolus of right tibia, initial encounter for closed fracture S82.51XA    Hypoglycemia E16.2    Syncope and collapse R55    Vasovagal syncope R55       Isolation/Infection:   Isolation            No Isolation          Patient Infection Status       None to display            Nurse Assessment:  Last Vital Signs: BP (!) 108/58   Pulse 66   Temp 98.1 °F (36.7 °C) (Oral)   Resp 16   Ht 5' 4\" (1.626 m)   Wt 165 lb

## 2023-04-01 NOTE — DISCHARGE INSTRUCTIONS
Right ankle fracture  Weight bearing as tolerated to the right leg. Maintain splint at all times, keep splint clean, dry, intact. Wear walking boot when out of bed  Elevate the leg to help reduce swelling and pain  Ice as needed to the affected area  Pain control using over-the-counter medications such as Tylenol or ibuprofen  Follow-up with Dr. Juan Martinez in 1 week.   Call [facility] orthopedics at [phone] to schedule appointment or with any questions

## 2023-04-02 LAB
ANION GAP SERPL CALCULATED.3IONS-SCNC: 4 MMOL/L (ref 3–16)
BUN SERPL-MCNC: 18 MG/DL (ref 7–20)
CALCIUM SERPL-MCNC: 8.9 MG/DL (ref 8.3–10.6)
CHLORIDE SERPL-SCNC: 108 MMOL/L (ref 99–110)
CO2 SERPL-SCNC: 29 MMOL/L (ref 21–32)
CREAT SERPL-MCNC: 0.8 MG/DL (ref 0.6–1.2)
GFR SERPLBLD CREATININE-BSD FMLA CKD-EPI: >60 ML/MIN/{1.73_M2}
GLUCOSE SERPL-MCNC: 97 MG/DL (ref 70–99)
POTASSIUM SERPL-SCNC: 4.1 MMOL/L (ref 3.5–5.1)
SODIUM SERPL-SCNC: 141 MMOL/L (ref 136–145)

## 2023-04-02 PROCEDURE — G0378 HOSPITAL OBSERVATION PER HR: HCPCS

## 2023-04-02 PROCEDURE — 6370000000 HC RX 637 (ALT 250 FOR IP)

## 2023-04-02 PROCEDURE — 6360000002 HC RX W HCPCS

## 2023-04-02 PROCEDURE — 97162 PT EVAL MOD COMPLEX 30 MIN: CPT

## 2023-04-02 PROCEDURE — 97530 THERAPEUTIC ACTIVITIES: CPT

## 2023-04-02 PROCEDURE — 6370000000 HC RX 637 (ALT 250 FOR IP): Performed by: NURSE PRACTITIONER

## 2023-04-02 PROCEDURE — 80048 BASIC METABOLIC PNL TOTAL CA: CPT

## 2023-04-02 PROCEDURE — 97166 OT EVAL MOD COMPLEX 45 MIN: CPT

## 2023-04-02 PROCEDURE — 2580000003 HC RX 258

## 2023-04-02 PROCEDURE — 36415 COLL VENOUS BLD VENIPUNCTURE: CPT

## 2023-04-02 PROCEDURE — 96372 THER/PROPH/DIAG INJ SC/IM: CPT

## 2023-04-02 RX ORDER — POLYETHYLENE GLYCOL 3350 17 G/17G
17 POWDER, FOR SOLUTION ORAL DAILY
Status: DISCONTINUED | OUTPATIENT
Start: 2023-04-02 | End: 2023-04-03 | Stop reason: HOSPADM

## 2023-04-02 RX ORDER — METOPROLOL SUCCINATE 25 MG/1
25 TABLET, EXTENDED RELEASE ORAL DAILY
Status: DISCONTINUED | OUTPATIENT
Start: 2023-04-02 | End: 2023-04-03 | Stop reason: HOSPADM

## 2023-04-02 RX ADMIN — CARBIDOPA AND LEVODOPA 2 TABLET: 10; 100 TABLET ORAL at 17:01

## 2023-04-02 RX ADMIN — OXYCODONE AND ACETAMINOPHEN 1 TABLET: 7.5; 325 TABLET ORAL at 10:05

## 2023-04-02 RX ADMIN — POLYETHYLENE GLYCOL 3350 17 G: 17 POWDER, FOR SOLUTION ORAL at 09:53

## 2023-04-02 RX ADMIN — ENOXAPARIN SODIUM 40 MG: 100 INJECTION SUBCUTANEOUS at 09:52

## 2023-04-02 RX ADMIN — Medication 10 ML: at 09:54

## 2023-04-02 RX ADMIN — DULOXETINE HYDROCHLORIDE 90 MG: 60 CAPSULE, DELAYED RELEASE ORAL at 09:53

## 2023-04-02 RX ADMIN — Medication 10 ML: at 20:09

## 2023-04-02 RX ADMIN — CLONAZEPAM 1 MG: 1 TABLET ORAL at 20:08

## 2023-04-02 RX ADMIN — Medication 3 MG: at 20:08

## 2023-04-02 RX ADMIN — CARBIDOPA AND LEVODOPA 2 TABLET: 10; 100 TABLET ORAL at 09:00

## 2023-04-02 RX ADMIN — METOPROLOL SUCCINATE 25 MG: 25 TABLET, EXTENDED RELEASE ORAL at 13:47

## 2023-04-02 RX ADMIN — PRAMIPEXOLE DIHYDROCHLORIDE 0.25 MG: 0.25 TABLET ORAL at 20:08

## 2023-04-02 RX ADMIN — CARBIDOPA AND LEVODOPA 2 TABLET: 10; 100 TABLET ORAL at 12:14

## 2023-04-02 RX ADMIN — LETROZOLE 2.5 MG: 2.5 TABLET, FILM COATED ORAL at 09:52

## 2023-04-02 RX ADMIN — DIPHENHYDRAMINE HYDROCHLORIDE 25 MG: 25 TABLET ORAL at 20:08

## 2023-04-02 ASSESSMENT — PAIN - FUNCTIONAL ASSESSMENT: PAIN_FUNCTIONAL_ASSESSMENT: PREVENTS OR INTERFERES SOME ACTIVE ACTIVITIES AND ADLS

## 2023-04-02 ASSESSMENT — PAIN SCALES - GENERAL
PAINLEVEL_OUTOF10: 4
PAINLEVEL_OUTOF10: 6
PAINLEVEL_OUTOF10: 5
PAINLEVEL_OUTOF10: 5

## 2023-04-02 ASSESSMENT — PAIN DESCRIPTION - DESCRIPTORS
DESCRIPTORS: ACHING

## 2023-04-02 ASSESSMENT — PAIN DESCRIPTION - LOCATION
LOCATION: ANKLE

## 2023-04-02 ASSESSMENT — PAIN DESCRIPTION - ORIENTATION
ORIENTATION: RIGHT

## 2023-04-02 ASSESSMENT — PAIN DESCRIPTION - PAIN TYPE: TYPE: ACUTE PAIN

## 2023-04-02 NOTE — PLAN OF CARE
..Pt scoring pain on 0-10 scale. Pain medications given per MAR. Pt instructed to call out when pain level increasing. Call light within reach. Nurse will continue to reassess and monitor. Kwame Kowalski .Bed in lowest position, wheels locked, 2/4 side rails up, nonskid footwear on. Bed/ chair check alarm in place, call light within reach. Pt instructed to call out when needing assistance. Pt stated understanding. Nurse will continue to monitor. Problem: Discharge Planning  Goal: Discharge to home or other facility with appropriate resources  Outcome: Progressing  Flowsheets (Taken 4/1/2023 1224)  Discharge to home or other facility with appropriate resources: Identify barriers to discharge with patient and caregiver     Problem: Pain  Goal: Verbalizes/displays adequate comfort level or baseline comfort level  Outcome: Progressing     Problem: Skin/Tissue Integrity  Goal: Absence of new skin breakdown  Description: 1. Monitor for areas of redness and/or skin breakdown  2. Assess vascular access sites hourly  3. Every 4-6 hours minimum:  Change oxygen saturation probe site  4. Every 4-6 hours:  If on nasal continuous positive airway pressure, respiratory therapy assess nares and determine need for appliance change or resting period.   Outcome: Progressing     Problem: Safety - Adult  Goal: Free from fall injury  Outcome: Progressing     Problem: ABCDS Injury Assessment  Goal: Absence of physical injury  Outcome: Progressing     Problem: Musculoskeletal - Adult  Goal: Return mobility to safest level of function  Outcome: Progressing  Goal: Maintain proper alignment of affected body part  Outcome: Progressing  Goal: Return ADL status to a safe level of function  Outcome: Progressing

## 2023-04-03 VITALS
WEIGHT: 165 LBS | BODY MASS INDEX: 28.17 KG/M2 | DIASTOLIC BLOOD PRESSURE: 82 MMHG | HEIGHT: 64 IN | RESPIRATION RATE: 17 BRPM | SYSTOLIC BLOOD PRESSURE: 120 MMHG | OXYGEN SATURATION: 93 % | HEART RATE: 80 BPM | TEMPERATURE: 97.6 F

## 2023-04-03 LAB
ANION GAP SERPL CALCULATED.3IONS-SCNC: 7 MMOL/L (ref 3–16)
BUN SERPL-MCNC: 17 MG/DL (ref 7–20)
CALCIUM SERPL-MCNC: 9 MG/DL (ref 8.3–10.6)
CHLORIDE SERPL-SCNC: 106 MMOL/L (ref 99–110)
CO2 SERPL-SCNC: 28 MMOL/L (ref 21–32)
CREAT SERPL-MCNC: 0.9 MG/DL (ref 0.6–1.2)
GFR SERPLBLD CREATININE-BSD FMLA CKD-EPI: >60 ML/MIN/{1.73_M2}
GLUCOSE SERPL-MCNC: 93 MG/DL (ref 70–99)
POTASSIUM SERPL-SCNC: 4.5 MMOL/L (ref 3.5–5.1)
SODIUM SERPL-SCNC: 141 MMOL/L (ref 136–145)

## 2023-04-03 PROCEDURE — 36415 COLL VENOUS BLD VENIPUNCTURE: CPT

## 2023-04-03 PROCEDURE — 6370000000 HC RX 637 (ALT 250 FOR IP)

## 2023-04-03 PROCEDURE — 97530 THERAPEUTIC ACTIVITIES: CPT

## 2023-04-03 PROCEDURE — 97116 GAIT TRAINING THERAPY: CPT

## 2023-04-03 PROCEDURE — G0378 HOSPITAL OBSERVATION PER HR: HCPCS

## 2023-04-03 PROCEDURE — 97535 SELF CARE MNGMENT TRAINING: CPT

## 2023-04-03 PROCEDURE — 6360000002 HC RX W HCPCS

## 2023-04-03 PROCEDURE — APPNB30 APP NON BILLABLE TIME 0-30 MINS: Performed by: SPECIALIST/TECHNOLOGIST

## 2023-04-03 PROCEDURE — 97110 THERAPEUTIC EXERCISES: CPT

## 2023-04-03 PROCEDURE — 96372 THER/PROPH/DIAG INJ SC/IM: CPT

## 2023-04-03 PROCEDURE — 2580000003 HC RX 258

## 2023-04-03 PROCEDURE — 80048 BASIC METABOLIC PNL TOTAL CA: CPT

## 2023-04-03 RX ORDER — OXYCODONE AND ACETAMINOPHEN 7.5; 325 MG/1; MG/1
1 TABLET ORAL EVERY 8 HOURS PRN
Qty: 12 TABLET | Refills: 0 | Status: SHIPPED | OUTPATIENT
Start: 2023-04-03 | End: 2023-04-06

## 2023-04-03 RX ADMIN — POLYETHYLENE GLYCOL 3350 17 G: 17 POWDER, FOR SOLUTION ORAL at 09:09

## 2023-04-03 RX ADMIN — CARBIDOPA AND LEVODOPA 2 TABLET: 10; 100 TABLET ORAL at 11:52

## 2023-04-03 RX ADMIN — CARBIDOPA AND LEVODOPA 2 TABLET: 10; 100 TABLET ORAL at 09:05

## 2023-04-03 RX ADMIN — Medication 10 ML: at 09:10

## 2023-04-03 RX ADMIN — METOPROLOL SUCCINATE 25 MG: 25 TABLET, EXTENDED RELEASE ORAL at 09:05

## 2023-04-03 RX ADMIN — LETROZOLE 2.5 MG: 2.5 TABLET, FILM COATED ORAL at 09:09

## 2023-04-03 RX ADMIN — DULOXETINE HYDROCHLORIDE 90 MG: 60 CAPSULE, DELAYED RELEASE ORAL at 09:09

## 2023-04-03 RX ADMIN — OXYCODONE AND ACETAMINOPHEN 1 TABLET: 7.5; 325 TABLET ORAL at 10:33

## 2023-04-03 RX ADMIN — ENOXAPARIN SODIUM 40 MG: 100 INJECTION SUBCUTANEOUS at 09:10

## 2023-04-03 ASSESSMENT — PAIN SCALES - GENERAL
PAINLEVEL_OUTOF10: 6
PAINLEVEL_OUTOF10: 5

## 2023-04-03 ASSESSMENT — PAIN DESCRIPTION - DESCRIPTORS: DESCRIPTORS: JABBING;SHARP

## 2023-04-03 ASSESSMENT — PAIN DESCRIPTION - LOCATION: LOCATION: KNEE;FOOT

## 2023-04-03 ASSESSMENT — PAIN DESCRIPTION - ORIENTATION: ORIENTATION: LEFT;RIGHT

## 2023-04-03 ASSESSMENT — PAIN DESCRIPTION - PAIN TYPE: TYPE: ACUTE PAIN

## 2023-04-03 NOTE — PLAN OF CARE
Problem: Safety - Adult  Goal: Free from fall injury  Outcome: Progressing  Pt instructed to use call light for assistance. Bed in low position, 2/4 side side rails up, bed alarm engaged, and call light within reach.

## 2023-04-03 NOTE — CARE COORDINATION
Case Management Assessment  Initial Evaluation    Date/Time of Evaluation: 4/3/2023 10:00 AM  Assessment Completed by: Azucena Adams RN    If patient is discharged prior to next notation, then this note serves as note for discharge by case management. Patient Name: Nohemi Chong                   YOB: 1940  Diagnosis: Syncope and collapse [R55]  Dehydration [E86.0]  Vasovagal syncope [R55]  Hypoglycemia [E16.2]  Goals of care, counseling/discussion [Z71.89]  Patient is full code [Z78.9]  Displaced fracture of medial malleolus of unspecified tibia, initial encounter for closed fracture Geno Horne                   Date / Time: 3/31/2023  8:45 AM    Patient Admission Status: Observation   Readmission Risk (Low < 19, Mod (19-27), High > 27): Readmission Risk Score: 9    Current PCP: Ramond Closs, MD  PCP verified by CM? Yes    Chart Reviewed: Yes      History Provided by: Patient  Patient Orientation: Alert and Oriented    Patient Cognition: Alert    Hospitalization in the last 30 days (Readmission):  No    If yes, Readmission Assessment in  Navigator will be completed. Advance Directives:      Code Status: Full Code   Patient's Primary Decision Maker is: Legal Next of Kin    Primary Decision MakerGrklarissa Saleem - Spouse - [phone]    Discharge Planning:    Patient lives with: Spouse/Significant Other Type of Home: House  Primary Care Giver: Self  Patient Support Systems include: Spouse/Significant Other   Current Financial resources: None  Current community resources: None  Current services prior to admission: Durable Medical Equipment            Current DME: Bedside Commode, Cane, Wheelchair, Walker            Type of Home Care services:  None    ADLS  Prior functional level: Independent in ADLs/IADLs  Current functional level:  Independent in ADLs/IADLs    PT AM-PAC: 12 /24  OT AM-PAC: 15 /24    Family can provide assistance at DC: No  Would you like Case Management to discuss the

## 2023-04-03 NOTE — PROGRESS NOTES
4 Eyes Admission Assessment     I agree as the admission nurse that 2 RN's have performed a thorough Head to Toe Skin Assessment on the patient. ALL assessment sites listed below have been assessed on admission. Areas assessed by both nurses: yes  [x]   Head, Face, and Ears   [x]   Shoulders, Back, and Chest (scar lower back)  [x]   Arms, Elbows, and Hands   [x]   Coccyx, Sacrum, and Ischium  [x]   Legs, Feet, and Heels        Does the Patient have Skin Breakdown?   No         Sean Prevention initiated:  Yes   Wound Care Orders initiated:  No      [facility] nurse consulted for Pressure Injury (Stage 3,4, Unstageable, DTI, NWPT, and Complex wounds) or Sean score 18 or lower:  No      Nurse 1 eSignature: Electronically signed by Pritesh Grove RN on 4/1/23 at 5:11 AM EDT    **SHARE this note so that the co-signing nurse is able to place an eSignature**    Nurse 2 eSignature: {Esignature:[id number]}
Ortho Note    PT/OT recommending SNF due to no 24hr care available at pt's home.   Pt refusing SNF, wishes to return home  Will order walking boot, OK to progress to WBAT at this time  Orders placed for walking boot and change of WB status  Please call with any questions    Ana Liu PA-C
Patient requested writer to call pharmacy to enquire if our pramipexole has yellow dye in it. Pharmacy states it DOES have yellow dye. Patient notified to allow  to bring in home medication.
Physical Therapy  Facility/Department: [place] [id number] - MED SURG/ORTHO  Physical Therapy Initial Assessment and Treatment    Name: Delores Gardner  : 1940  MRN: [id number]  Date of Service: 2023    Discharge Recommendations:  Subacute/Skilled Nursing Facility   PT Equipment Recommendations  Equipment Needed: No  Other: defer to patient's facility      Patient Diagnosis(es): The primary encounter diagnosis was Syncope and collapse. Diagnoses of Dehydration, Displaced fracture of medial malleolus of unspecified tibia, initial encounter for closed fracture, Hypoglycemia, Goals of care, counseling/discussion, and Patient is full code were also pertinent to this visit. Past Medical History:  has a past medical history of Colon polyp, Parkinson's disease ([facility] [address] Spinal stenosis, and SVT (supraventricular tachycardia) ([facility] [address] Past Surgical History:  has a past surgical history that includes Appendectomy;  section; back surgery (2014); Cholecystectomy; Colonoscopy; and Colonoscopy (N/A, 6/3/2022). If pt is unable to be seen after this session, please let this note serve as discharge summary. Please see case management note for discharge disposition. Thank you. Barriers to home discharge:   [x] Steps to access home entry or bed/bath: 4+2 CHANCE   [x] Reported available assist at home upon discharge limited   [x] Other: admitted for fall with fracture  Assessment   Body Structures, Functions, Activity Limitations Requiring Skilled Therapeutic Intervention: Decreased functional mobility ; Decreased ADL status; Decreased strength;Decreased safe awareness;Decreased cognition;Decreased endurance;Decreased balance;Decreased high-level IADLs;Decreased posture; Increased pain  Assessment: Patient is an 81year old female who presented to [facility] with a fall and subsequent fracture of her right medial malleolus. Per orthopedics they are recommending non-surgical management and that patient should be 50% PWB RLE.  Patient reports
Progress Note      PCP: Tonia Reece MD    Date of Admission: 3/31/2023    Chief Complaint: syncope    Hospital Course: Moisés Diaz is an 81year old female with past medical history of Parkinson's disease, spinal stenosis, SVT, restless leg syndrome, breast cancer on letrozole, and frequent falls who presented to [facility] ED on 3/31 for syncopal episode. Patient just recently transitioned from using a walker after recovering from spinal surgery last year. She experienced a fall down a hill on 3/30, landing on the R wrist/hip/ankle. She had immediate pain and swelling of the R ankle but managed these symptoms at home for the evening. The next morning, she got up to walk to the bathroom with assistance from her  and had significant pain with each step. Patient then had a syncopal episode. Patient does not recall events around the syncopal episode, but denied preceding lightheadedness, vision changes, vertigo, or nausea. EMS was called and gave 50 mcg fentanyl en route for pain management. Upon ED arrival, patient had soft BP and heart rate in 60s. Hypoglycemic at 57. Patient was started on D50 and given 1L NS. EKG showed sinus bradycardia. Troponin negative. CXR showed mild bibasilar atelectasis. CT head and c-spine negative. Imaging of R wrist, hand, and pelvis were negative. R ankle xray showed mildly displaced transverse fracture of the medial malleolus. Patient given repeat 50mcg fentanyl in ED as well as 4mg morphine. Patient was admitted for syncopal workup and pain control. Patient had adequate pain control with transition from morphine to percocet. No repeat episodes of hypoglycemia or syncope. PT/OT to evaluate for possible rehab placement. Ortho evaluated, recommend splint with 50% weight bearing with assistive device. Patient to follow up outpatient in 1 week with Dr. Maria Esther Bañuelos and transition to walking boot.     Subjective:   Patient evaluated at bedside this AM. Reports no pain unless she
Pt escorted to vehicle via wheelchair. Walking boot in place.
Pt given d/c instructions and prescription. Pt and  verbalize understanding.
Precautions  Lower Extremity Weight Bearing Restrictions  Left Lower Extremity Weight Bearing: Partial Weight Bearing  Partial Weight Bearing Percentage Or Pounds: Partial Weight Bearing of 50% to Right leg. Position Activity Restriction  Other position/activity restrictions: purewick    Subjective   Subjective  Subjective: Pt in bed, agreeable and pleasant. Reports no pain at rest, 2/10 with movement. Vitals WFL. RN cleared pt for tx. Pt agreeable to getting to the chair. Orientation  Overall Orientation Status: Within Functional Limits  Orientation Level: Oriented to person;Oriented to place;Oriented to time;Oriented to situation  Pain: \"little pain\" when wiggles toes, at rest  Cognition  Overall Cognitive Status: Exceptions  Arousal/Alertness: Appropriate responses to stimuli  Following Commands: Follows multistep commands with repitition; Follows multistep commands with increased time  Attention Span: Attends with cues to redirect  Memory: Appears intact  Safety Judgement: Decreased awareness of need for safety;Decreased awareness of need for assistance  Problem Solving: Decreased awareness of errors  Insights: Decreased awareness of deficits  Initiation: Requires cues for some  Sequencing: Requires cues for some  Cognition Comment: patient has increased safety awareness however frequent cueing for safety needed. Objective    Vitals  Vitals  Heart Rate: 68  Heart Rate Source: Monitor  BP: 118/74  BP Location: Right upper arm  BP Method: Automatic  Patient Position: Sitting  MAP (Calculated): 89  SpO2: 96 %  Bed Mobility Training  Bed Mobility Training: Yes  Overall Level of Assistance: Minimum assistance  Interventions: Safety awareness training; Tactile cues; Verbal cues  Rolling: Contact-guard assistance  Supine to Sit: Minimum assistance  Sit to Supine: Other (comment)  Scooting: Minimum assistance;Contact-guard assistance; Additional time  Balance  Sitting: Impaired  Sitting - Static: Fair
Minimum assistance;Assist X1;Assist X2;Additional time; Adaptive equipment (RW)  Bed to Chair: Minimum assistance;Assist X2;Additional time; Adaptive equipment (RW, 50% WB)  Gait Training  Gait Training: Yes  Right Side Weight Bearing: Partial (%) (50%)  Gait  Overall Level of Assistance: Minimum assistance;Assist X2;Additional time; Adaptive equipment (RW)  Interventions: Safety awareness training; Tactile cues; Verbal cues  Base of Support: Center of gravity altered;Narrowed  Speed/Lana: Pace decreased (< 100 feet/min); Slow;Shuffled  Step Length: Right shortened;Left shortened  Gait Abnormalities: Decreased step clearance;Trunk sway increased; Antalgic  Distance (ft): 15 Feet (X 2 with seated rest break)  Assistive Device: Walker, rolling;Gait belt     PT Exercises  Exercise Treatment: Supine exs: AP LLE, heelsides BLE & GS BLE X 10-15 reps; seated  LAQ, marchles BLE 10-15 reps     Safety Devices  Type of Devices: All fall risk precautions in place; Patient at risk for falls;Nurse notified;Call light within reach;Gait belt; Heels elevated for pressure relief; Chair alarm in place; Left in chair; All robert prominences offloaded       Goals  Short Term Goals  Time Frame for Short Term Goals: 1 week 4/9/23 50% PWB RLE for all goals  Short Term Goal 1: Supine <> sit with modified independence. ; 4/3 mod A  Short Term Goal 2: Sit <> stand  and Bed <> chair with least restrictive assistive device and CGA; 4/3 min A of 2  Short Term Goal 3: Ambulate 25 feet with least restrictive assistive device and CGA; 4/3 15' X 2 with RW min A of 1-2  Short Term Goal 4: Ascend 3 steps with rail and CGA; 4/3 NT  Short Term Goal 5: By 4/5/23 Patient will tolerate 12-15 reps of her exercises to maximize her strength/endurance; 4/3 progressing  Patient Goals   Patient Goals : To go home.     Education  Patient Education  Education Given To: Patient  Education Provided Comments: Disease Specific Education: Patient educated on 50% PWB RLE weight
insight  Capillary Refill: Brisk,3 seconds, normal   Peripheral Pulses: +2 palpable, equal bilaterally       Labs:   Recent Labs     03/31/23  0900   WBC 6.5   HGB 13.3   HCT 40.0        Recent Labs     03/31/23  0900 04/01/23  0611    141   K 4.5 4.6    107   CO2 29 28   BUN 22* 18   CREATININE 0.9 0.8   CALCIUM 9.1 8.9     Recent Labs     03/31/23  0900   AST 16   ALT 13   BILITOT 0.5   ALKPHOS 66     No results for input(s): INR in the last 72 hours. Recent Labs     03/31/23 0900   TROPONINI <0.01     Radiology:  XR WRIST RIGHT (MIN 3 VIEWS)   Final Result   CHEST:      Mild bibasilar atelectasis, without additional acute intrathoracic   abnormality. RIGHT WRIST:      No acute abnormality of the right wrist.  Mild osteoarthritis at the 1st   carpometacarpal joint. RIGHT HAND:      No acute abnormality of the right hand. 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 PELVIS/RIGHT HIP:      No acute radiographic abnormality of the right hip or osseous pelvis. Mild   symmetric bilateral hip and SI joint osteoarthritis. RIGHT ANKLE:      Acute mildly displaced transverse fracture of the medial malleolus, with   overlying soft tissue swelling. XR HIP 2-3 VW W PELVIS RIGHT   Final Result   CHEST:      Mild bibasilar atelectasis, without additional acute intrathoracic   abnormality. RIGHT WRIST:      No acute abnormality of the right wrist.  Mild osteoarthritis at the 1st   carpometacarpal joint. RIGHT HAND:      No acute abnormality of the right hand. 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 PELVIS/RIGHT HIP:      No acute radiographic abnormality of the right hip or osseous pelvis. Mild   symmetric bilateral hip and SI joint osteoarthritis. RIGHT ANKLE:      Acute mildly displaced transverse fracture of the medial malleolus, with   overlying soft tissue swelling.          XR HAND RIGHT (MIN 3 VIEWS)   Final Result   CHEST:      Mild bibasilar
assist with functional/toilet transfers with walker by 4-09-23  Short Term Goal 2: min assist with LE self care with AE by 4-08-23  Short Term Goal 3: tolerate 15 reps BUE strengthening exercises; Short Term Goal 4: set up seated in chair for UE self care;   Patient Goals   Patient goals : \"go home\"       Therapy Time   Individual Concurrent Group Co-treatment   Time In [address]         Time Out [address] [place] [address] Drive         Minutes [address] [facility], [place]

## 2023-04-04 NOTE — CARE COORDINATION
04/04/23  8:32 AM   Call received from ISABEL LOUISE with [facility]. States no C orders or signed DARINEL in Epic. CM sent Perfect Serve message to provider yesterday at [phone] - read at [phone]. CM re-messaged provider, Cora Hensley, today. He states he will complete now.

## (undated) DEVICE — SYRINGE MED 50ML LUERLOCK TIP

## (undated) DEVICE — ENDOSCOPIC KIT 6X3/16 FT COLON W/ 1.1 OZ 2 GWN W/O BRSH

## (undated) DEVICE — SNARES COLD OVAL 10MM THIN

## (undated) DEVICE — TRAP SPEC RETRV CLR PLAS POLYP IN LN SUCT QUIK CTCH

## (undated) DEVICE — BW-412T DISP COMBO CLEANING BRUSH: Brand: SINGLE USE COMBINATION CLEANING BRUSH

## (undated) DEVICE — AIR/WATER CLEANING ADAPTER FOR OLYMPUS® GI ENDOSCOPE: Brand: BULLDOG®

## (undated) DEVICE — SOLUTION IV IRRIG WATER 500ML POUR BRL ST 2F7113

## (undated) DEVICE — VALVE SUCTION AIR H2O SET ORCA POD + DISP